# Patient Record
Sex: FEMALE | Race: WHITE | Employment: UNEMPLOYED | URBAN - METROPOLITAN AREA
[De-identification: names, ages, dates, MRNs, and addresses within clinical notes are randomized per-mention and may not be internally consistent; named-entity substitution may affect disease eponyms.]

---

## 2018-04-11 ENCOUNTER — APPOINTMENT (OUTPATIENT)
Dept: GENERAL RADIOLOGY | Age: 38
End: 2018-04-11
Payer: COMMERCIAL

## 2018-04-11 ENCOUNTER — HOSPITAL ENCOUNTER (EMERGENCY)
Age: 38
Discharge: HOME | End: 2018-04-11
Attending: EMERGENCY MEDICINE
Payer: COMMERCIAL

## 2018-04-11 VITALS
HEART RATE: 92 BPM | OXYGEN SATURATION: 98 % | BODY MASS INDEX: 41.82 KG/M2 | TEMPERATURE: 97.7 F | SYSTOLIC BLOOD PRESSURE: 166 MMHG | HEIGHT: 60 IN | WEIGHT: 213 LBS | DIASTOLIC BLOOD PRESSURE: 107 MMHG | RESPIRATION RATE: 16 BRPM

## 2018-04-11 DIAGNOSIS — R11.2 NON-INTRACTABLE VOMITING WITH NAUSEA, UNSPECIFIED VOMITING TYPE: Primary | ICD-10-CM

## 2018-04-11 DIAGNOSIS — R10.10 PAIN OF UPPER ABDOMEN: ICD-10-CM

## 2018-04-11 DIAGNOSIS — K29.00 OTHER ACUTE GASTRITIS WITHOUT HEMORRHAGE: ICD-10-CM

## 2018-04-11 LAB
ABSOLUTE EOS #: 0.2 K/UL (ref 0–0.4)
ABSOLUTE IMMATURE GRANULOCYTE: ABNORMAL K/UL (ref 0–0.3)
ABSOLUTE LYMPH #: 1.8 K/UL (ref 1–4.8)
ABSOLUTE MONO #: 0.5 K/UL (ref 0.1–1.3)
ALBUMIN SERPL-MCNC: 4.1 G/DL (ref 3.5–5.2)
ALBUMIN/GLOBULIN RATIO: ABNORMAL (ref 1–2.5)
ALP BLD-CCNC: 59 U/L (ref 35–104)
ALT SERPL-CCNC: 14 U/L (ref 5–33)
ANION GAP SERPL CALCULATED.3IONS-SCNC: 9 MMOL/L (ref 9–17)
AST SERPL-CCNC: 15 U/L
BASOPHILS # BLD: 1 % (ref 0–2)
BASOPHILS ABSOLUTE: 0 K/UL (ref 0–0.2)
BILIRUB SERPL-MCNC: 0.23 MG/DL (ref 0.3–1.2)
BUN BLDV-MCNC: 9 MG/DL (ref 6–20)
BUN/CREAT BLD: ABNORMAL (ref 9–20)
CALCIUM SERPL-MCNC: 8.6 MG/DL (ref 8.6–10.4)
CHLORIDE BLD-SCNC: 102 MMOL/L (ref 98–107)
CO2: 26 MMOL/L (ref 20–31)
CREAT SERPL-MCNC: 0.72 MG/DL (ref 0.5–0.9)
DIFFERENTIAL TYPE: ABNORMAL
EOSINOPHILS RELATIVE PERCENT: 2 % (ref 0–4)
GFR AFRICAN AMERICAN: >60 ML/MIN
GFR NON-AFRICAN AMERICAN: >60 ML/MIN
GFR SERPL CREATININE-BSD FRML MDRD: ABNORMAL ML/MIN/{1.73_M2}
GFR SERPL CREATININE-BSD FRML MDRD: ABNORMAL ML/MIN/{1.73_M2}
GLUCOSE BLD-MCNC: 112 MG/DL (ref 70–99)
HCG QUALITATIVE: NEGATIVE
HCT VFR BLD CALC: 44.9 % (ref 36–46)
HEMOGLOBIN: 15.1 G/DL (ref 12–16)
IMMATURE GRANULOCYTES: ABNORMAL %
LIPASE: 32 U/L (ref 13–60)
LYMPHOCYTES # BLD: 20 % (ref 24–44)
MAGNESIUM: 1.9 MG/DL (ref 1.6–2.6)
MCH RBC QN AUTO: 31.6 PG (ref 26–34)
MCHC RBC AUTO-ENTMCNC: 33.6 G/DL (ref 31–37)
MCV RBC AUTO: 93.9 FL (ref 80–100)
MONOCYTES # BLD: 6 % (ref 1–7)
NRBC AUTOMATED: ABNORMAL PER 100 WBC
PDW BLD-RTO: 12.8 % (ref 11.5–14.9)
PLATELET # BLD: 237 K/UL (ref 150–450)
PLATELET ESTIMATE: ABNORMAL
PMV BLD AUTO: 9.7 FL (ref 6–12)
POTASSIUM SERPL-SCNC: 3.8 MMOL/L (ref 3.7–5.3)
RBC # BLD: 4.78 M/UL (ref 4–5.2)
RBC # BLD: ABNORMAL 10*6/UL
SEG NEUTROPHILS: 71 % (ref 36–66)
SEGMENTED NEUTROPHILS ABSOLUTE COUNT: 6.4 K/UL (ref 1.3–9.1)
SODIUM BLD-SCNC: 137 MMOL/L (ref 135–144)
TOTAL PROTEIN: 7.2 G/DL (ref 6.4–8.3)
WBC # BLD: 9 K/UL (ref 3.5–11)
WBC # BLD: ABNORMAL 10*3/UL

## 2018-04-11 PROCEDURE — 84703 CHORIONIC GONADOTROPIN ASSAY: CPT

## 2018-04-11 PROCEDURE — 2580000003 HC RX 258: Performed by: EMERGENCY MEDICINE

## 2018-04-11 PROCEDURE — 74022 RADEX COMPL AQT ABD SERIES: CPT

## 2018-04-11 PROCEDURE — 83690 ASSAY OF LIPASE: CPT

## 2018-04-11 PROCEDURE — 96375 TX/PRO/DX INJ NEW DRUG ADDON: CPT

## 2018-04-11 PROCEDURE — 99284 EMERGENCY DEPT VISIT MOD MDM: CPT

## 2018-04-11 PROCEDURE — 80053 COMPREHEN METABOLIC PANEL: CPT

## 2018-04-11 PROCEDURE — 83735 ASSAY OF MAGNESIUM: CPT

## 2018-04-11 PROCEDURE — S0028 INJECTION, FAMOTIDINE, 20 MG: HCPCS | Performed by: EMERGENCY MEDICINE

## 2018-04-11 PROCEDURE — 6360000002 HC RX W HCPCS: Performed by: EMERGENCY MEDICINE

## 2018-04-11 PROCEDURE — 2500000003 HC RX 250 WO HCPCS: Performed by: EMERGENCY MEDICINE

## 2018-04-11 PROCEDURE — 36415 COLL VENOUS BLD VENIPUNCTURE: CPT

## 2018-04-11 PROCEDURE — 85025 COMPLETE CBC W/AUTO DIFF WBC: CPT

## 2018-04-11 PROCEDURE — 6370000000 HC RX 637 (ALT 250 FOR IP): Performed by: EMERGENCY MEDICINE

## 2018-04-11 PROCEDURE — 96374 THER/PROPH/DIAG INJ IV PUSH: CPT

## 2018-04-11 RX ORDER — MAGNESIUM HYDROXIDE/ALUMINUM HYDROXICE/SIMETHICONE 120; 1200; 1200 MG/30ML; MG/30ML; MG/30ML
15 SUSPENSION ORAL ONCE
Status: COMPLETED | OUTPATIENT
Start: 2018-04-11 | End: 2018-04-11

## 2018-04-11 RX ORDER — GABAPENTIN 600 MG/1
600 TABLET ORAL 3 TIMES DAILY
COMMUNITY

## 2018-04-11 RX ORDER — OMEPRAZOLE 20 MG/1
20 CAPSULE, DELAYED RELEASE ORAL DAILY
Qty: 60 CAPSULE | Refills: 0 | Status: SHIPPED | OUTPATIENT
Start: 2018-04-11

## 2018-04-11 RX ORDER — ALPRAZOLAM 1 MG/1
1 TABLET ORAL 3 TIMES DAILY
COMMUNITY

## 2018-04-11 RX ORDER — ONDANSETRON 4 MG/1
4 TABLET, FILM COATED ORAL EVERY 8 HOURS PRN
Qty: 30 TABLET | Refills: 0 | Status: SHIPPED | OUTPATIENT
Start: 2018-04-11

## 2018-04-11 RX ORDER — ONDANSETRON 2 MG/ML
8 INJECTION INTRAMUSCULAR; INTRAVENOUS ONCE
Status: COMPLETED | OUTPATIENT
Start: 2018-04-11 | End: 2018-04-11

## 2018-04-11 RX ORDER — 0.9 % SODIUM CHLORIDE 0.9 %
1000 INTRAVENOUS SOLUTION INTRAVENOUS ONCE
Status: COMPLETED | OUTPATIENT
Start: 2018-04-11 | End: 2018-04-11

## 2018-04-11 RX ADMIN — LIDOCAINE HYDROCHLORIDE 15 ML: 20 SOLUTION ORAL; TOPICAL at 09:47

## 2018-04-11 RX ADMIN — SODIUM CHLORIDE 1000 ML: 9 INJECTION, SOLUTION INTRAVENOUS at 08:37

## 2018-04-11 RX ADMIN — PROCHLORPERAZINE EDISYLATE 10 MG: 5 INJECTION INTRAMUSCULAR; INTRAVENOUS at 09:44

## 2018-04-11 RX ADMIN — ALUMINUM HYDROXIDE, MAGNESIUM HYDROXIDE, AND SIMETHICONE 15 ML: 200; 200; 20 SUSPENSION ORAL at 09:47

## 2018-04-11 RX ADMIN — FAMOTIDINE 20 MG: 10 INJECTION, SOLUTION INTRAVENOUS at 08:36

## 2018-04-11 RX ADMIN — ONDANSETRON 8 MG: 2 INJECTION INTRAMUSCULAR; INTRAVENOUS at 08:36

## 2018-04-11 ASSESSMENT — ENCOUNTER SYMPTOMS
ABDOMINAL PAIN: 1
WHEEZING: 0
CHOKING: 0
COUGH: 0
CONSTIPATION: 0
STRIDOR: 0
CHEST TIGHTNESS: 0
NAUSEA: 1
VOMITING: 1
SHORTNESS OF BREATH: 0
DIARRHEA: 1
APNEA: 0
SORE THROAT: 0

## 2018-04-11 ASSESSMENT — PAIN DESCRIPTION - DESCRIPTORS: DESCRIPTORS: CRAMPING

## 2018-04-11 ASSESSMENT — PAIN DESCRIPTION - PAIN TYPE: TYPE: ACUTE PAIN

## 2018-04-11 ASSESSMENT — PAIN SCALES - GENERAL: PAINLEVEL_OUTOF10: 6

## 2018-04-11 ASSESSMENT — PAIN DESCRIPTION - LOCATION: LOCATION: ABDOMEN

## 2018-04-11 ASSESSMENT — PAIN DESCRIPTION - ORIENTATION: ORIENTATION: MID;UPPER

## 2019-04-12 ENCOUNTER — TELEPHONE (OUTPATIENT)
Dept: INTERNAL MEDICINE | Age: 39
End: 2019-04-12

## 2019-04-12 NOTE — TELEPHONE ENCOUNTER
PC to pt to reschedule appt on 4/17/19, provider is not in the office that day now.  She is new patient and can be scheduled with first available provider

## 2020-07-09 ENCOUNTER — TELEMEDICINE (OUTPATIENT)
Dept: FAMILY MEDICINE CLINIC | Age: 40
End: 2020-07-09
Payer: MEDICAID

## 2020-07-09 ENCOUNTER — PATIENT MESSAGE (OUTPATIENT)
Dept: FAMILY MEDICINE CLINIC | Age: 40
End: 2020-07-09

## 2020-07-09 PROBLEM — E66.01 MORBIDLY OBESE (HCC): Status: ACTIVE | Noted: 2020-07-09

## 2020-07-09 PROCEDURE — G8427 DOCREV CUR MEDS BY ELIG CLIN: HCPCS | Performed by: FAMILY MEDICINE

## 2020-07-09 PROCEDURE — G8421 BMI NOT CALCULATED: HCPCS | Performed by: FAMILY MEDICINE

## 2020-07-09 PROCEDURE — 1036F TOBACCO NON-USER: CPT | Performed by: FAMILY MEDICINE

## 2020-07-09 PROCEDURE — 99204 OFFICE O/P NEW MOD 45 MIN: CPT | Performed by: FAMILY MEDICINE

## 2020-07-09 RX ORDER — LEVOFLOXACIN 500 MG/1
500 TABLET, FILM COATED ORAL DAILY
Qty: 10 TABLET | Refills: 0 | Status: SHIPPED | OUTPATIENT
Start: 2020-07-09 | End: 2020-07-19

## 2020-07-09 RX ORDER — BENZONATATE 100 MG/1
200 CAPSULE ORAL 3 TIMES DAILY PRN
Qty: 60 CAPSULE | Refills: 0 | Status: SHIPPED | OUTPATIENT
Start: 2020-07-09 | End: 2020-07-19

## 2020-07-09 RX ORDER — FLUTICASONE PROPIONATE 110 UG/1
2 AEROSOL, METERED RESPIRATORY (INHALATION) 2 TIMES DAILY
Qty: 1 INHALER | Refills: 3 | Status: SHIPPED | OUTPATIENT
Start: 2020-07-09 | End: 2020-09-30 | Stop reason: SDUPTHER

## 2020-07-09 ASSESSMENT — PATIENT HEALTH QUESTIONNAIRE - PHQ9
SUM OF ALL RESPONSES TO PHQ QUESTIONS 1-9: 0
2. FEELING DOWN, DEPRESSED OR HOPELESS: 0
SUM OF ALL RESPONSES TO PHQ9 QUESTIONS 1 & 2: 0
1. LITTLE INTEREST OR PLEASURE IN DOING THINGS: 0
SUM OF ALL RESPONSES TO PHQ QUESTIONS 1-9: 0

## 2020-07-09 NOTE — PROGRESS NOTES
2020    TELEHEALTH EVALUATION -- Audio/Visual (During QWMPJ-60 public health emergency)    HPI:    Amanda Bishop (:  1980) has requested an audio/video evaluation for the following concern(s):    Patient is seen in virtual visit evaluation to establish care as a new patient  She states that 3 or 4 weeks ago she went to the urgent care for cough congestion shortness of breath she was afebrile they did not COVID test to her run any other testing. They gave her Zithromax and Medrol Dosepak and an albuterol inhaler  She states she is actually feeling worse her shortness of breath is increasing as well she is continuing to have a terrible cough. Does not have a history of any lung disorders    Review of Systems    Prior to Visit Medications    Medication Sig Taking? Authorizing Provider   ALPRAZolam Ceesyeda Moody) 1 MG tablet Take 1 mg by mouth 3 times daily. Fernando Muse Historical Provider, MD   lurasidone (LATUDA) 80 MG TABS tablet Take 80 mg by mouth nightly  Historical Provider, MD   gabapentin (NEURONTIN) 600 MG tablet Take 600 mg by mouth 3 times daily. Fernando Muse   Historical Provider, MD   ondansetron (ZOFRAN) 4 MG tablet Take 1 tablet by mouth every 8 hours as needed for Nausea or Vomiting  Christiane Bloom MD   omeprazole (Lorrain Bodily) 20 MG delayed release capsule Take 1 capsule by mouth daily  Christiane Bloom MD       Social History     Tobacco Use    Smoking status: Never Smoker    Smokeless tobacco: Never Used   Substance Use Topics    Alcohol use: No    Drug use: No        Allergies   Allergen Reactions    Dilaudid [Hydromorphone Hcl] Itching   ,   Past Medical History:   Diagnosis Date    Anxiety     Bipolar disorder ([facility] [address]     Depression     GERD (gastroesophageal reflux disease)     Hypertension    ,   Past Surgical History:   Procedure Laterality Date    CHOLECYSTECTOMY      UPPER GASTROINTESTINAL ENDOSCOPY  2017   ,   Social History     Tobacco Use    Smoking status: Never Smoker    Smokeless tobacco: Never Used   Substance Use Topics    Alcohol use: No    Drug use: No   , History reviewed. No pertinent family history. PHYSICAL EXAMINATION:  [ INSTRUCTIONS:  \"[x]\" Indicates a positive item  \"[]\" Indicates a negative item  -- DELETE ALL ITEMS NOT EXAMINED]  Vital Signs: (As obtained by patient/caregiver or practitioner observation)    Blood pressure-  Heart rate-    Respiratory rate-    Temperature-  Pulse oximetry-     Constitutional: [x] Appears well-developed and well-nourished [x] No apparent distress      [] Abnormal-   Mental status  [x] Alert and awake  [x] Oriented to person/place/time [x]Able to follow commands      Eyes:  EOM    [x]  Normal  [] Abnormal-  Sclera  [x]  Normal  [] Abnormal -         Discharge [x]  None visible  [] Abnormal -    HENT:   [x] Normocephalic, atraumatic. [] Abnormal   [x] Mouth/Throat: Mucous membranes are moist.     External Ears [x] Normal  [] Abnormal-     Neck: [x] No visualized mass     Pulmonary/Chest: [x] Respiratory effort normal.  [x] No visualized signs of difficulty breathing or respiratory distress        [] Abnormal-      Musculoskeletal:   [x] Normal gait with no signs of ataxia         [x] Normal range of motion of neck        [] Abnormal-       Neurological:        [x] No Facial Asymmetry (Cranial nerve 7 motor function) (limited exam to video visit)          [x] No gaze palsy        [] Abnormal-         Skin:        [x] No significant exanthematous lesions or discoloration noted on facial skin         [] Abnormal-            Psychiatric:       [x] Normal Affect [x] No Hallucinations        [] Abnormal-     Other pertinent observable physical exam findings-     ASSESSMENT/PLAN:  1. Establishing care with new doctor, encounter for    2. Morbidly obese ([address]    3. Cough present for greater than 3 weeks    4.  SOB (shortness of breath)      Orders Placed This Encounter   Procedures    COVID-19 Ambulatory     Requested Prescriptions     Signed Prescriptions

## 2020-07-13 NOTE — TELEPHONE ENCOUNTER
From: Truman Ray  To:  Sada Luong DO  Sent: 7/9/2020 12:39 PM EDT  Subject: Visit Follow-Up Question    Sorry I need the address where I need to go to take the COVID test?

## 2020-09-30 RX ORDER — FLUTICASONE PROPIONATE 110 UG/1
2 AEROSOL, METERED RESPIRATORY (INHALATION) 2 TIMES DAILY
Qty: 1 INHALER | Refills: 3 | Status: SHIPPED | OUTPATIENT
Start: 2020-09-30 | End: 2020-11-14 | Stop reason: SDUPTHER

## 2020-11-12 ENCOUNTER — PATIENT MESSAGE (OUTPATIENT)
Dept: FAMILY MEDICINE CLINIC | Age: 40
End: 2020-11-12

## 2020-11-12 ENCOUNTER — E-VISIT (OUTPATIENT)
Dept: FAMILY MEDICINE CLINIC | Age: 40
End: 2020-11-12
Payer: MEDICAID

## 2020-11-12 PROCEDURE — 99422 OL DIG E/M SVC 11-20 MIN: CPT | Performed by: FAMILY MEDICINE

## 2020-11-12 RX ORDER — PANTOPRAZOLE SODIUM 40 MG/1
40 TABLET, DELAYED RELEASE ORAL
Qty: 30 TABLET | Refills: 3 | Status: SHIPPED | OUTPATIENT
Start: 2020-11-12 | End: 2021-03-22

## 2020-11-12 NOTE — PROGRESS NOTES
Patient was consulted via her online questioning for visit she states she has had ongoing symptoms with GERD that been going on for years 2017 she had a scope and they diagnosed her with GERD as well she has been on several meds she still gets symptoms multiple times a day or and they can last for 20 minutes at a time  She would like some other advice on what to do  IMP:   1. Gastroesophageal reflux disease with esophagitis without hemorrhage     PLAN:  No orders of the defined types were placed in this encounter.     Requested Prescriptions     Signed Prescriptions Disp Refills    pantoprazole (PROTONIX) 40 MG tablet 30 tablet 3     Sig: Take 1 tablet by mouth daily (with breakfast)     Authorizing Provider: Tania Aguilar recommended she stay on a low-fat low acid diet avoid chocolate alcohol caffeine and spicy foods as well and no ibuprofen or Aleve  If not imp follow up

## 2020-11-13 ENCOUNTER — E-VISIT (OUTPATIENT)
Dept: FAMILY MEDICINE CLINIC | Age: 40
End: 2020-11-13
Payer: MEDICAID

## 2020-11-13 ENCOUNTER — PATIENT MESSAGE (OUTPATIENT)
Dept: FAMILY MEDICINE CLINIC | Age: 40
End: 2020-11-13

## 2020-11-13 PROCEDURE — 99421 OL DIG E/M SVC 5-10 MIN: CPT | Performed by: FAMILY MEDICINE

## 2020-11-13 NOTE — PROGRESS NOTES
Patient contacted me via E visit consultation  Stating she has been having an ongoing cough she always has a cough she states it is a little worse lately she does not necessarily feel sick although she coughs up sputum  Imp:   1. Cough present for greater than 3 weeks    PLan:   No orders of the defined types were placed in this encounter.     Recommended if patient feels worse she be seen at an urgent care also stated if she is having reflux symptomatology this could be related  But to actually get an accurate diagnosis she should be seen

## 2020-11-13 NOTE — TELEPHONE ENCOUNTER
From: Bruno Mariee  To:  Brianna Laird DO  Sent: 11/13/2020 1:39 PM EST  Subject: Non-Urgent Medical Question    Are you able to give me something to help control my cough until I'm able to get to an urgent care

## 2020-11-14 RX ORDER — FLUTICASONE PROPIONATE 110 UG/1
2 AEROSOL, METERED RESPIRATORY (INHALATION) 2 TIMES DAILY
Qty: 1 INHALER | Refills: 3 | Status: SHIPPED | OUTPATIENT
Start: 2020-11-14 | End: 2021-11-14

## 2020-11-14 NOTE — TELEPHONE ENCOUNTER
Jimbo Cross is calling to request a refill on the following medication(s):    Last Visit Date (If Applicable):  33/95/6711    Next Visit Date:    Visit date not found    Medication Request:  Requested Prescriptions     Pending Prescriptions Disp Refills    fluticasone (FLOVENT HFA) 110 MCG/ACT inhaler 1 Inhaler 3     Sig: Inhale 2 puffs into the lungs 2 times daily

## 2020-12-08 ENCOUNTER — PATIENT MESSAGE (OUTPATIENT)
Dept: FAMILY MEDICINE CLINIC | Age: 40
End: 2020-12-08

## 2020-12-09 NOTE — TELEPHONE ENCOUNTER
From: Avtar Freedman  To: Noam Song DO  Sent: 12/8/2020 9:36 PM EST  Subject: Visit Follow-Up Question    I have been taking Musanex as you said for over 2 weeks and theres little difference in the cough and mucus. I am still using the inhaler and my mother gave me a new Albuterol inhaler. Is there anything else we can do?    Thanks

## 2021-01-07 ENCOUNTER — PATIENT MESSAGE (OUTPATIENT)
Dept: FAMILY MEDICINE CLINIC | Age: 41
End: 2021-01-07

## 2021-01-07 NOTE — TELEPHONE ENCOUNTER
From: Margo Lin  To: Verónica Santoyo DO  Sent: 1/7/2021 8:39 AM EST  Subject: Non-Urgent Medical Question    Good morning I'm e-mailing you because I've been taking over the counter medicine for cough and yet I still have a bad cough. Is there anything you can give me besides the inhaler? The Mucinex just isn't working.    Thanks

## 2021-01-08 NOTE — TELEPHONE ENCOUNTER
From: Deborah Banerjee  To: Shonda Lynn DO  Sent: 1/7/2021 5:49 PM EST  Subject: Non-Urgent Medical Question      It's the same cough that I've brought up before. There's no new symptoms      ----- Message -----   From:Delmis Ash DO   Sent:1/7/2021 2:19 PM EST   To:Leanna Bee   Subject:RE: Non-Urgent Medical Question    If your cough is new within the last few days or weeks. Then you need to get Covid tested I cannot order that unless you have a virtual visit not a E visit.       ----- Message -----   From:Leanna Bee   Sent:1/7/2021 8:39 AM EST   To:Delmis Ash DO   Subject:Non-Urgent Medical Question    Good morning I'm e-mailing you because I've been taking over the counter medicine for cough and yet I still have a bad cough. Is there anything you can give me besides the inhaler? The Mucinex just isn't working.    Thanks

## 2021-03-22 DIAGNOSIS — K21.00 GASTROESOPHAGEAL REFLUX DISEASE WITH ESOPHAGITIS WITHOUT HEMORRHAGE: ICD-10-CM

## 2021-03-22 RX ORDER — PANTOPRAZOLE SODIUM 40 MG/1
TABLET, DELAYED RELEASE ORAL
Qty: 30 TABLET | Refills: 3 | Status: SHIPPED | OUTPATIENT
Start: 2021-03-22 | End: 2021-04-27 | Stop reason: SDUPTHER

## 2021-03-30 ENCOUNTER — PATIENT MESSAGE (OUTPATIENT)
Dept: FAMILY MEDICINE CLINIC | Age: 41
End: 2021-03-30

## 2021-03-31 NOTE — TELEPHONE ENCOUNTER
From: Brandon Govea  To: Ernie Donaldson DO  Sent: 3/30/2021 3:29 PM EDT  Subject: Non-Urgent Medical Question    My allergies are starting to act up is there anything you can do for me, and is it possible to up my my on my meds you gave me for heartburn?

## 2021-04-21 ENCOUNTER — OFFICE VISIT (OUTPATIENT)
Dept: FAMILY MEDICINE CLINIC | Age: 41
End: 2021-04-21
Payer: MEDICAID

## 2021-04-21 VITALS
TEMPERATURE: 98.4 F | HEIGHT: 60 IN | HEART RATE: 109 BPM | WEIGHT: 214 LBS | OXYGEN SATURATION: 98 % | SYSTOLIC BLOOD PRESSURE: 133 MMHG | BODY MASS INDEX: 42.01 KG/M2 | DIASTOLIC BLOOD PRESSURE: 105 MMHG

## 2021-04-21 DIAGNOSIS — J45.40 MODERATE PERSISTENT ASTHMA WITHOUT COMPLICATION: ICD-10-CM

## 2021-04-21 DIAGNOSIS — Z00.00 WELL ADULT EXAM: ICD-10-CM

## 2021-04-21 DIAGNOSIS — K21.00 GASTROESOPHAGEAL REFLUX DISEASE WITH ESOPHAGITIS WITHOUT HEMORRHAGE: Primary | ICD-10-CM

## 2021-04-21 PROCEDURE — 99214 OFFICE O/P EST MOD 30 MIN: CPT | Performed by: FAMILY MEDICINE

## 2021-04-21 PROCEDURE — 1036F TOBACCO NON-USER: CPT | Performed by: FAMILY MEDICINE

## 2021-04-21 PROCEDURE — G8417 CALC BMI ABV UP PARAM F/U: HCPCS | Performed by: FAMILY MEDICINE

## 2021-04-21 PROCEDURE — G8427 DOCREV CUR MEDS BY ELIG CLIN: HCPCS | Performed by: FAMILY MEDICINE

## 2021-04-21 RX ORDER — ALBUTEROL SULFATE 90 UG/1
2 AEROSOL, METERED RESPIRATORY (INHALATION) 4 TIMES DAILY PRN
Qty: 1 INHALER | Refills: 5 | Status: SHIPPED | OUTPATIENT
Start: 2021-04-21 | End: 2021-06-03 | Stop reason: SDUPTHER

## 2021-04-21 RX ORDER — CETIRIZINE HYDROCHLORIDE 10 MG/1
10 TABLET ORAL DAILY
Qty: 30 TABLET | Refills: 11 | Status: SHIPPED | OUTPATIENT
Start: 2021-04-21 | End: 2021-06-03 | Stop reason: SDUPTHER

## 2021-04-21 RX ORDER — MONTELUKAST SODIUM 10 MG/1
10 TABLET ORAL DAILY
Qty: 30 TABLET | Refills: 11 | Status: SHIPPED | OUTPATIENT
Start: 2021-04-21 | End: 2021-07-06 | Stop reason: SDUPTHER

## 2021-04-21 ASSESSMENT — PATIENT HEALTH QUESTIONNAIRE - PHQ9
SUM OF ALL RESPONSES TO PHQ QUESTIONS 1-9: 0
2. FEELING DOWN, DEPRESSED OR HOPELESS: 0
1. LITTLE INTEREST OR PLEASURE IN DOING THINGS: 0
SUM OF ALL RESPONSES TO PHQ QUESTIONS 1-9: 0
SUM OF ALL RESPONSES TO PHQ9 QUESTIONS 1 & 2: 0

## 2021-04-21 NOTE — PROGRESS NOTES
[facility]  [address] [place] [address] [address] [place] [address]  Dept: [phone]      Blanche Garsia is a 47 y.o. female who presents today for follow up on her  medical conditions as noted below. Chief Complaint   Patient presents with    Cough     for over a year     Other     Stomach issue        Patient Active Problem List:     Morbidly obese [facility])     Past Medical History:   Diagnosis Date    Anxiety     Bipolar disorder ([address]     Depression     GERD (gastroesophageal reflux disease)     Hypertension       Past Surgical History:   Procedure Laterality Date    CHOLECYSTECTOMY      UPPER GASTROINTESTINAL ENDOSCOPY  2017     History reviewed. No pertinent family history. Current Outpatient Medications   Medication Sig Dispense Refill    fluticasone-salmeterol (ADVAIR DISKUS) 500-50 MCG/DOSE diskus inhaler Inhale 1 puff into the lungs 2 times daily Rinse mouth after use. 1 Inhaler 11    albuterol sulfate HFA (VENTOLIN HFA) 108 (90 Base) MCG/ACT inhaler Inhale 2 puffs into the lungs 4 times daily as needed for Wheezing 1 Inhaler 5    montelukast (SINGULAIR) 10 MG tablet Take 1 tablet by mouth daily 30 tablet 11    cetirizine (ZYRTEC) 10 MG tablet Take 1 tablet by mouth daily 30 tablet 11    pantoprazole (PROTONIX) 40 MG tablet take 1 tablet by mouth once daily WITH BREAKFAST 30 tablet 3    omeprazole (PRILOSEC) 20 MG delayed release capsule Take 1 capsule by mouth daily 60 capsule 0    fluticasone (FLOVENT HFA) 110 MCG/ACT inhaler Inhale 2 puffs into the lungs 2 times daily (Patient not taking: Reported on 4/21/2021) 1 Inhaler 3    ALPRAZolam (XANAX) 1 MG tablet Take 1 mg by mouth 3 times daily. Geraldine Bingham lurasidone (LATUDA) 80 MG TABS tablet Take 80 mg by mouth nightly      gabapentin (NEURONTIN) 600 MG tablet Take 600 mg by mouth 3 times daily. .      ondansetron (ZOFRAN) 4 MG tablet Take 1 tablet by mouth every 8 hours as needed for Nausea or Vomiting (Patient not taking: Reported on 4/21/2021) 30 tablet 0     No current facility-administered medications for this visit. ALLERGIES:    Allergies   Allergen Reactions    Dilaudid [Hydromorphone Hcl] Itching       Social History     Tobacco Use    Smoking status: Never Smoker    Smokeless tobacco: Never Used   Substance Use Topics    Alcohol use: No        BUN (mg/dL)   Date Value   04/11/2018 9     CREATININE (mg/dL)   Date Value   04/11/2018 0.72     Glucose (mg/dL)   Date Value   04/11/2018 112 (H)              Subjective:      HPI  She is here stating that she is continuing to cough its been over a year she coughs pretty much all the time it is worse at night when she first gets up in the morning she did smoke but has quit quite a while ago  She has used the Flovent consistently for the last several months and also she tried her mother-in-law's albuterol inhaler which did not seem to really do anything to help her she also states that her allergies are really bad and she notices that the coughing is worse seasonally    She also states her reflux symptoms are really bad she is taking the medication but notices it does not seem to be working as well and she does take NSAIDs    Review of Systems:     Constitutional: Negative for fever, appetite change and fatigue. Family social and medical history reviewed and unchanged     HENT: Negative. Negative for nosebleeds, trouble swallowing and neck pain. Eyes: Negative for photophobia and visual disturbance. Respiratory: Negative. Negative for chest tightness and shortness of breath. Cardiovascular: Negative. Negative for chest pain and leg swelling. Gastrointestinal: Negative. Negative for abdominal pain and blood in stool. Endocrine: Negative for cold intolerance and polyuria. Genitourinary: Negative for dysuria and hematuria. Musculoskeletal: Negative. Skin: Negative for rash. Allergic/Immunologic: Negative. persistent asthma without complication    3. Well adult exam          Plan:      Call or return to clinic prn if these symptoms worsen or fail to improve as anticipated. I have reviewed the instructions with the patient, answering all questions to her satisfaction. Return if symptoms worsen or fail to improve. Orders Placed This Encounter   Procedures    XR CHEST (2 VW)     Standing Status:   Future     Standing Expiration Date:   4/21/2022     Order Specific Question:   Reason for exam:     Answer:   cough    CBC Auto Differential     Standing Status:   Future     Standing Expiration Date:   10/21/2021    Comprehensive Metabolic Panel     Standing Status:   Future     Standing Expiration Date:   10/21/2021    T4, Free     Standing Status:   Future     Standing Expiration Date:   10/21/2021    Lipid Panel     Standing Status:   Future     Standing Expiration Date:   10/21/2021     Order Specific Question:   Is Patient Fasting?/# of Hours     Answer:   yes    Vitamin D 25 Hydroxy     Standing Status:   Future     Standing Expiration Date:   4/21/2022    Full PFT Study With Bronchodilator     Standing Status:   Future     Standing Expiration Date:   4/21/2022     Orders Placed This Encounter   Medications    fluticasone-salmeterol (ADVAIR DISKUS) 500-50 MCG/DOSE diskus inhaler     Sig: Inhale 1 puff into the lungs 2 times daily Rinse mouth after use.      Dispense:  1 Inhaler     Refill:  11    albuterol sulfate HFA (VENTOLIN HFA) 108 (90 Base) MCG/ACT inhaler     Sig: Inhale 2 puffs into the lungs 4 times daily as needed for Wheezing     Dispense:  1 Inhaler     Refill:  5    montelukast (SINGULAIR) 10 MG tablet     Sig: Take 1 tablet by mouth daily     Dispense:  30 tablet     Refill:  11    cetirizine (ZYRTEC) 10 MG tablet     Sig: Take 1 tablet by mouth daily     Dispense:  30 tablet     Refill:  11   Ivonne the above medication regimen  Getting the testing done avoiding all NSAIDs and following up in the office if not improving    Electronically signed by Charity Carolina DO on 4/21/2021 at 5:01 PM

## 2021-04-22 ENCOUNTER — PATIENT MESSAGE (OUTPATIENT)
Dept: FAMILY MEDICINE CLINIC | Age: 41
End: 2021-04-22

## 2021-04-22 NOTE — TELEPHONE ENCOUNTER
From: Aj Dickson  To:  Ilana Dominguez DO  Sent: 4/22/2021 7:47 AM EDT  Subject: Non-Urgent Medical Question    Good morning yesterday when I came in to see I forgot to mention that the past few day my right side has been hurting me even when I urinate, there's a dull pain and it makes me feel sick

## 2021-04-27 DIAGNOSIS — K21.00 GASTROESOPHAGEAL REFLUX DISEASE WITH ESOPHAGITIS WITHOUT HEMORRHAGE: ICD-10-CM

## 2021-04-28 RX ORDER — PANTOPRAZOLE SODIUM 40 MG/1
40 TABLET, DELAYED RELEASE ORAL DAILY
Qty: 30 TABLET | Refills: 3 | Status: SHIPPED | OUTPATIENT
Start: 2021-04-28 | End: 2021-06-01 | Stop reason: SDUPTHER

## 2021-05-19 ENCOUNTER — PATIENT MESSAGE (OUTPATIENT)
Dept: FAMILY MEDICINE CLINIC | Age: 41
End: 2021-05-19

## 2021-05-19 DIAGNOSIS — R05.8 COUGH PRESENT FOR GREATER THAN 3 WEEKS: Primary | ICD-10-CM

## 2021-05-19 NOTE — TELEPHONE ENCOUNTER
From: Sheridan Close  To:  Isaiah Perez DO  Sent: 5/19/2021 8:13 AM EDT  Subject: Non-Urgent Medical Question    It's [place] almost a month on the new meds, I've noticed a little more aggression, my anxiety happens more often, cough is still the same but now I'm coughing up thick [place] of a brownish mucus, my coughs are hard, the coughing makes my chest, back,and neck hurt

## 2021-06-01 DIAGNOSIS — K21.00 GASTROESOPHAGEAL REFLUX DISEASE WITH ESOPHAGITIS WITHOUT HEMORRHAGE: ICD-10-CM

## 2021-06-01 RX ORDER — PANTOPRAZOLE SODIUM 40 MG/1
40 TABLET, DELAYED RELEASE ORAL DAILY
Qty: 30 TABLET | Refills: 3 | Status: SHIPPED | OUTPATIENT
Start: 2021-06-01 | End: 2021-07-06 | Stop reason: SDUPTHER

## 2021-06-01 NOTE — TELEPHONE ENCOUNTER
Blanche Garsia is calling to request a refill on the following medication(s):    Last Visit Date (If Applicable):  5/22/6234    Next Visit Date:    Visit date not found    Medication Request:  Requested Prescriptions     Pending Prescriptions Disp Refills    pantoprazole (PROTONIX) 40 MG tablet 30 tablet 3     Sig: Take 1 tablet by mouth daily

## 2021-06-03 DIAGNOSIS — J45.40 MODERATE PERSISTENT ASTHMA WITHOUT COMPLICATION: ICD-10-CM

## 2021-06-04 RX ORDER — CETIRIZINE HYDROCHLORIDE 10 MG/1
10 TABLET ORAL DAILY
Qty: 30 TABLET | Refills: 11 | Status: SHIPPED | OUTPATIENT
Start: 2021-06-04 | End: 2021-07-06 | Stop reason: SDUPTHER

## 2021-06-04 RX ORDER — ALBUTEROL SULFATE 90 UG/1
2 AEROSOL, METERED RESPIRATORY (INHALATION) 4 TIMES DAILY PRN
Qty: 1 INHALER | Refills: 5 | Status: SHIPPED | OUTPATIENT
Start: 2021-06-04 | End: 2021-07-06 | Stop reason: SDUPTHER

## 2021-06-04 NOTE — TELEPHONE ENCOUNTER
Brendan Sims is calling to request a refill on the following medication(s):    Medication Request:  Requested Prescriptions     Pending Prescriptions Disp Refills    albuterol sulfate HFA (VENTOLIN HFA) 108 (90 Base) MCG/ACT inhaler 1 Inhaler 5     Sig: Inhale 2 puffs into the lungs 4 times daily as needed for Wheezing    cetirizine (ZYRTEC) 10 MG tablet 30 tablet 11     Sig: Take 1 tablet by mouth daily       Last Visit Date (If Applicable):  7/38/9609    Next Visit Date:    Visit date not found

## 2021-07-06 DIAGNOSIS — J45.40 MODERATE PERSISTENT ASTHMA WITHOUT COMPLICATION: ICD-10-CM

## 2021-07-06 DIAGNOSIS — K21.00 GASTROESOPHAGEAL REFLUX DISEASE WITH ESOPHAGITIS WITHOUT HEMORRHAGE: ICD-10-CM

## 2021-07-06 RX ORDER — CETIRIZINE HYDROCHLORIDE 10 MG/1
10 TABLET ORAL DAILY
Qty: 30 TABLET | Refills: 11 | Status: SHIPPED | OUTPATIENT
Start: 2021-07-06

## 2021-07-06 RX ORDER — MONTELUKAST SODIUM 10 MG/1
10 TABLET ORAL DAILY
Qty: 30 TABLET | Refills: 11 | Status: SHIPPED | OUTPATIENT
Start: 2021-07-06

## 2021-07-06 RX ORDER — PANTOPRAZOLE SODIUM 40 MG/1
40 TABLET, DELAYED RELEASE ORAL DAILY
Qty: 30 TABLET | Refills: 3 | Status: SHIPPED | OUTPATIENT
Start: 2021-07-06 | End: 2021-07-08 | Stop reason: SDUPTHER

## 2021-07-06 RX ORDER — ALBUTEROL SULFATE 90 UG/1
2 AEROSOL, METERED RESPIRATORY (INHALATION) 4 TIMES DAILY PRN
Qty: 1 INHALER | Refills: 5 | Status: SHIPPED | OUTPATIENT
Start: 2021-07-06

## 2021-07-06 NOTE — TELEPHONE ENCOUNTER
Avtar Freedman is calling to request a refill on the following medication(s):    Last Visit Date (If Applicable):  2/86/6606    Next Visit Date:    Visit date not found    Medication Request:  Requested Prescriptions     Pending Prescriptions Disp Refills    montelukast (SINGULAIR) 10 MG tablet 30 tablet 11     Sig: Take 1 tablet by mouth daily    pantoprazole (PROTONIX) 40 MG tablet 30 tablet 3     Sig: Take 1 tablet by mouth daily    albuterol sulfate HFA (VENTOLIN HFA) 108 (90 Base) MCG/ACT inhaler 1 Inhaler 5     Sig: Inhale 2 puffs into the lungs 4 times daily as needed for Wheezing    cetirizine (ZYRTEC) 10 MG tablet 30 tablet 11     Sig: Take 1 tablet by mouth daily

## 2021-07-08 DIAGNOSIS — K21.00 GASTROESOPHAGEAL REFLUX DISEASE WITH ESOPHAGITIS WITHOUT HEMORRHAGE: ICD-10-CM

## 2021-07-09 RX ORDER — PANTOPRAZOLE SODIUM 40 MG/1
40 TABLET, DELAYED RELEASE ORAL DAILY
Qty: 30 TABLET | Refills: 3 | Status: SHIPPED | OUTPATIENT
Start: 2021-07-09 | End: 2021-08-13 | Stop reason: SDUPTHER

## 2021-07-09 NOTE — TELEPHONE ENCOUNTER
Bailey Anthony is calling to request a refill on the following medication(s):    Last Visit Date (If Applicable):  1/13/9334    Next Visit Date:    Visit date not found    Medication Request:  Requested Prescriptions     Pending Prescriptions Disp Refills    pantoprazole (PROTONIX) 40 MG tablet 30 tablet 3     Sig: Take 1 tablet by mouth daily

## 2021-07-18 ENCOUNTER — PATIENT MESSAGE (OUTPATIENT)
Dept: FAMILY MEDICINE CLINIC | Age: 41
End: 2021-07-18

## 2021-07-19 ENCOUNTER — E-VISIT (OUTPATIENT)
Dept: FAMILY MEDICINE CLINIC | Age: 41
End: 2021-07-19
Payer: MEDICAID

## 2021-07-19 DIAGNOSIS — J30.1 SEASONAL ALLERGIC RHINITIS DUE TO POLLEN: Primary | ICD-10-CM

## 2021-07-19 PROCEDURE — 99421 OL DIG E/M SVC 5-10 MIN: CPT | Performed by: FAMILY MEDICINE

## 2021-07-19 NOTE — TELEPHONE ENCOUNTER
From: Bruno Mariee  To: Brianna DO Eitan  Sent: 7/18/2021 10:47 PM EDT  Subject: Non-Urgent Medical Question    Good morning, I'm writing this to you because I can't see my psychiatrist until August, 19th, and I'm having really, really, bad anxiety, I've already lost over 30 pounds I'm now under 200 pounds because of the separation im going through with my wife and my anxiety is getting to me, really bad, I can't sleep, I can't eat, I don't drink nothing and I need something for this please can you help me? My mind hasn't stopped racing for over a month and it's just getting worse! I know the emergency room can't help me.    Thanks Vanda Jordan

## 2021-07-20 ENCOUNTER — TELEPHONE (OUTPATIENT)
Dept: FAMILY MEDICINE CLINIC | Age: 41
End: 2021-07-20

## 2021-07-20 NOTE — TELEPHONE ENCOUNTER
She can do that with a psychiatrist or I have my charted her back 2 times now that she can do a virtual visit with me in the interim

## 2021-07-20 NOTE — PROGRESS NOTES
She is being seen for allergic rhinitis for a follow-up stating she is doing better on her medications  IMP:   1. Seasonal allergic rhinitis due to pollen        PLAN: Continue on all current medications

## 2021-07-20 NOTE — TELEPHONE ENCOUNTER
Pt called in stating that she is having depression and anxiety issues and wondering should she schedule a appt with you or a psychiatrist?    Please advise

## 2021-07-22 ENCOUNTER — TELEMEDICINE (OUTPATIENT)
Dept: FAMILY MEDICINE CLINIC | Age: 41
End: 2021-07-22
Payer: MEDICAID

## 2021-07-22 DIAGNOSIS — F41.9 ANXIETY: Primary | ICD-10-CM

## 2021-07-22 PROCEDURE — 1036F TOBACCO NON-USER: CPT | Performed by: FAMILY MEDICINE

## 2021-07-22 PROCEDURE — G8417 CALC BMI ABV UP PARAM F/U: HCPCS | Performed by: FAMILY MEDICINE

## 2021-07-22 PROCEDURE — 99213 OFFICE O/P EST LOW 20 MIN: CPT | Performed by: FAMILY MEDICINE

## 2021-07-22 PROCEDURE — G8427 DOCREV CUR MEDS BY ELIG CLIN: HCPCS | Performed by: FAMILY MEDICINE

## 2021-07-22 RX ORDER — ALPRAZOLAM 1 MG/1
1 TABLET ORAL 2 TIMES DAILY PRN
Qty: 30 TABLET | Refills: 0 | Status: SHIPPED | OUTPATIENT
Start: 2021-07-22 | End: 2021-08-21

## 2021-07-22 ASSESSMENT — PATIENT HEALTH QUESTIONNAIRE - PHQ9
SUM OF ALL RESPONSES TO PHQ9 QUESTIONS 1 & 2: 0
2. FEELING DOWN, DEPRESSED OR HOPELESS: 0
SUM OF ALL RESPONSES TO PHQ QUESTIONS 1-9: 0
1. LITTLE INTEREST OR PLEASURE IN DOING THINGS: 0
SUM OF ALL RESPONSES TO PHQ QUESTIONS 1-9: 0
SUM OF ALL RESPONSES TO PHQ QUESTIONS 1-9: 0

## 2021-07-23 ENCOUNTER — PATIENT MESSAGE (OUTPATIENT)
Dept: FAMILY MEDICINE CLINIC | Age: 41
End: 2021-07-23

## 2021-07-23 NOTE — TELEPHONE ENCOUNTER
From: Sanjuana Vincent  To:  Adonis Peace DO  Sent: 7/23/2021 1:41 PM EDT  Subject: Prescription Question    I asked earlier to see if you were able to prescribe me the gabapentin, so that I can start taking my latuda again because the gabapentin helped with my nerves ticks when I take the latuda and the latuda helps with my mood swings  Thanks

## 2021-08-05 ENCOUNTER — PATIENT MESSAGE (OUTPATIENT)
Dept: FAMILY MEDICINE CLINIC | Age: 41
End: 2021-08-05

## 2021-08-06 NOTE — TELEPHONE ENCOUNTER
From: Amy Segura  To: Cayla Bajwa DO  Sent: 8/5/2021 7:12 PM EDT  Subject: Prescription Question    So you gave these to me it says twice a day and that's what I've been doing, but I noticed I'm very low because I was only given 30 so I'm going to run out before my date on the 19th.   Thanks